# Patient Record
Sex: MALE | Race: WHITE | NOT HISPANIC OR LATINO
[De-identification: names, ages, dates, MRNs, and addresses within clinical notes are randomized per-mention and may not be internally consistent; named-entity substitution may affect disease eponyms.]

---

## 2018-12-03 ENCOUNTER — APPOINTMENT (OUTPATIENT)
Dept: PULMONOLOGY | Facility: CLINIC | Age: 48
End: 2018-12-03
Payer: COMMERCIAL

## 2018-12-03 VITALS
DIASTOLIC BLOOD PRESSURE: 100 MMHG | OXYGEN SATURATION: 93 % | HEIGHT: 72 IN | BODY MASS INDEX: 42.66 KG/M2 | HEART RATE: 96 BPM | RESPIRATION RATE: 18 BRPM | WEIGHT: 315 LBS | SYSTOLIC BLOOD PRESSURE: 140 MMHG | TEMPERATURE: 97.9 F

## 2018-12-03 DIAGNOSIS — I48.0 PAROXYSMAL ATRIAL FIBRILLATION: ICD-10-CM

## 2018-12-03 PROCEDURE — 99204 OFFICE O/P NEW MOD 45 MIN: CPT | Mod: GC

## 2018-12-03 RX ORDER — RIVAROXABAN 20 MG/1
20 TABLET, FILM COATED ORAL
Refills: 0 | Status: ACTIVE | COMMUNITY
Start: 2018-12-03

## 2023-10-27 ENCOUNTER — NON-APPOINTMENT (OUTPATIENT)
Age: 53
End: 2023-10-27

## 2023-11-01 ENCOUNTER — APPOINTMENT (OUTPATIENT)
Dept: PULMONOLOGY | Facility: CLINIC | Age: 53
End: 2023-11-01
Payer: COMMERCIAL

## 2023-11-01 VITALS
TEMPERATURE: 97.6 F | HEIGHT: 72 IN | BODY MASS INDEX: 42.66 KG/M2 | SYSTOLIC BLOOD PRESSURE: 187 MMHG | HEART RATE: 98 BPM | WEIGHT: 315 LBS | DIASTOLIC BLOOD PRESSURE: 96 MMHG | RESPIRATION RATE: 17 BRPM | OXYGEN SATURATION: 94 %

## 2023-11-01 VITALS — DIASTOLIC BLOOD PRESSURE: 94 MMHG | SYSTOLIC BLOOD PRESSURE: 139 MMHG

## 2023-11-01 PROCEDURE — 99213 OFFICE O/P EST LOW 20 MIN: CPT

## 2024-02-26 ENCOUNTER — APPOINTMENT (OUTPATIENT)
Dept: PULMONOLOGY | Facility: CLINIC | Age: 54
End: 2024-02-26
Payer: COMMERCIAL

## 2024-02-26 VITALS
WEIGHT: 315 LBS | RESPIRATION RATE: 16 BRPM | BODY MASS INDEX: 42.66 KG/M2 | SYSTOLIC BLOOD PRESSURE: 150 MMHG | OXYGEN SATURATION: 92 % | HEART RATE: 89 BPM | TEMPERATURE: 98 F | DIASTOLIC BLOOD PRESSURE: 102 MMHG | HEIGHT: 72 IN

## 2024-02-26 DIAGNOSIS — E66.01 MORBID (SEVERE) OBESITY DUE TO EXCESS CALORIES: ICD-10-CM

## 2024-02-26 DIAGNOSIS — G47.33 OBSTRUCTIVE SLEEP APNEA (ADULT) (PEDIATRIC): ICD-10-CM

## 2024-02-26 PROCEDURE — 99213 OFFICE O/P EST LOW 20 MIN: CPT

## 2024-02-26 RX ORDER — FUROSEMIDE 40 MG/1
40 TABLET ORAL
Qty: 180 | Refills: 0 | Status: ACTIVE | COMMUNITY
Start: 2024-01-19

## 2024-02-26 RX ORDER — CARVEDILOL PHOSPHATE 10 MG/1
10 CAPSULE, EXTENDED RELEASE ORAL
Qty: 90 | Refills: 0 | Status: ACTIVE | COMMUNITY
Start: 2024-02-09

## 2024-02-26 RX ORDER — POTASSIUM CHLORIDE 1500 MG/1
20 TABLET, EXTENDED RELEASE ORAL
Qty: 180 | Refills: 0 | Status: ACTIVE | COMMUNITY
Start: 2024-02-24

## 2024-02-26 RX ORDER — SILDENAFIL 100 MG/1
100 TABLET, FILM COATED ORAL
Qty: 6 | Refills: 0 | Status: ACTIVE | COMMUNITY
Start: 2023-09-09

## 2024-02-26 RX ORDER — DILTIAZEM HYDROCHLORIDE 180 MG/1
180 CAPSULE, EXTENDED RELEASE ORAL
Qty: 90 | Refills: 0 | Status: ACTIVE | COMMUNITY
Start: 2024-02-24

## 2024-02-26 RX ORDER — CARVEDILOL PHOSPHATE 20 MG/1
20 CAPSULE, EXTENDED RELEASE ORAL
Qty: 90 | Refills: 0 | Status: ACTIVE | COMMUNITY
Start: 2023-12-03

## 2024-02-26 RX ORDER — CARVEDILOL 6.25 MG/1
6.25 TABLET, FILM COATED ORAL TWICE DAILY
Refills: 0 | Status: COMPLETED | COMMUNITY
Start: 2018-12-03 | End: 2024-02-26

## 2024-02-26 RX ORDER — ALLOPURINOL 300 MG/1
300 TABLET ORAL
Qty: 90 | Refills: 0 | Status: ACTIVE | COMMUNITY
Start: 2023-12-29

## 2024-02-26 RX ORDER — SEMAGLUTIDE 1 MG/.5ML
1 INJECTION, SOLUTION SUBCUTANEOUS
Qty: 6 | Refills: 0 | Status: ACTIVE | COMMUNITY
Start: 2024-02-05

## 2024-02-26 RX ORDER — SEMAGLUTIDE 1.7 MG/.75ML
1.7 INJECTION, SOLUTION SUBCUTANEOUS
Qty: 9 | Refills: 0 | Status: ACTIVE | COMMUNITY
Start: 2024-02-13

## 2024-02-26 NOTE — HISTORY OF PRESENT ILLNESS
[Snoring] : snoring [Witnessed Apneas] : witnessed sleep apnea [Recent  Weight Gain] : recent weight gain [Obstructive Sleep Apnea] : obstructive sleep apnea [FreeTextEntry1] : 54 yo M PMH A.fib s/p cardioversion and severe KENDALL on bipap (dx 2004,  T90 93.8%), morbid obesity presenting for follow up of KENDALL.  Last seen in clinic Nov 2023. Today c/o upper jaw changes leading to overbite due to a nasal mask with tight fitting. States that he has been using his BiPAP device without difficulty. Currently on 16/14. Denies significant daytime fatigue or somnolence (ESS 2). No AM headaches. Continues to follow with his cardiologist for afib.   Lost 60 lbs over the past year via diet and medication prescribed by PCP.  BMI 65 (11/23) to 60 today  Meds: Coreg, rivaroxaban, furosemide, potassium, diltiazem, allopurinol  Diagnostic studies: HST 5/14/23: AHI 6.2 T90 17.7% CPAP titration 6/2/13: optimal pressure 14 cmH2O [Frequent Nocturnal Awakening] : no nocturnal awakening [Awakes Unrefreshed] : does not awake unrefreshed [Awakes with Headache] : no headache upon awakening [Awakening With Dry Mouth] : no dry mouth upon awakening [Daytime Somnolence] : no daytime somnolence [DIS] : no DIS [DMS] : no DMS [Lower Extremity Discomfort] : no lower extremity discomfort in evening or at bedtime [ESS] : 2

## 2024-02-26 NOTE — REVIEW OF SYSTEMS
[EDS: ESS=____] : no daytime somnolence [Fatigue] : no fatigue [Shortness Of Breath] : no shortness of breath [Chest Pain] : no chest pain [Difficulty Initiating Sleep] : no difficulty falling asleep

## 2024-02-26 NOTE — ASSESSMENT
[Obesity, Class III, BMI 40-49.9 (E66.01)] : macrophage activation syndrome [FreeTextEntry1] : 52 yo M PMH A.fib s/p cardioversion and severe KENDALL on bipap (dx 2004,  T90 93.8%), morbid obesity presenting for follow up of KENDALL.  Last seen in clinic Nov 2023. Today c/o upper jaw changes leading to overbite due to a nasal mask with tight fitting. States that he has been using his BiPAP device without difficulty. Currently on 16/14. Denies significant daytime fatigue or somnolence (ESS 2). No AM headaches.  He also complains of occasional dry mouth when he wakes up and is optimizing the machine's humidifier settings for comfort. Continues to follow with his cardiologist for afib.   Lost 60 lbs over the past year via diet and medication prescribed by PCP.  BMI 65 (11/23) to 60 today  Diagnostic studies: HST 5/14/23: AHI 6.2 T90 17.7% CPAP titration 6/2/13: optimal pressure 14 cm H2O  Assessment/Plan: 1) KENDALL/OHS on Bipap (dx 2004,  T90 93.8%): Compliance data shows normalization of AHI on BiPAP 16/14 with a minute ventilation 10.7 l/min. He continues to derive benefit from his derive with improvement in sleep consolidation and daytime somnolence (ESS 2). He denies difficulty with using the device. For his dental problems and overbite related to tight strap nasal mask use, we recommend him to get mask fitting evaluation with a cushioned nasal mask.  2) Obesity class III: He is recommended to continue to follow up with his primary care doctor for weight management.   Scar Lino MD  [facility] Fellow

## 2024-02-26 NOTE — PHYSICAL EXAM
[General Appearance - Well Developed] : well developed [General Appearance - Well Nourished] : well nourished [] : no respiratory distress [Exaggerated Use Of Accessory Muscles For Inspiration] : no accessory muscle use [Auscultation Breath Sounds / Voice Sounds] : lungs were clear to auscultation bilaterally [Cyanosis, Localized] : no localized cyanosis [Nail Clubbing] : no clubbing of the fingernails [Oriented To Time, Place, And Person] : oriented to person, place, and time [Neck Appearance] : the appearance of the neck was normal [Impaired Insight] : insight and judgment were intact [Heart Sounds] : normal S1 and S2 [FreeTextEntry1] : Irregularly irregular

## 2024-03-07 ENCOUNTER — APPOINTMENT (OUTPATIENT)
Dept: PULMONOLOGY | Facility: CLINIC | Age: 54
End: 2024-03-07
Payer: COMMERCIAL

## 2024-03-07 VITALS
HEART RATE: 96 BPM | BODY MASS INDEX: 42.66 KG/M2 | OXYGEN SATURATION: 87 % | WEIGHT: 315 LBS | DIASTOLIC BLOOD PRESSURE: 88 MMHG | HEIGHT: 72 IN | TEMPERATURE: 98 F | RESPIRATION RATE: 15 BRPM | SYSTOLIC BLOOD PRESSURE: 144 MMHG

## 2024-03-07 PROCEDURE — 94660 CPAP INITIATION&MGMT: CPT

## 2024-03-07 NOTE — PROCEDURE
[FreeTextEntry1] : Maskfitting Patient is currently using a Mirage FX mask, size wide. Patient was fit with two masks a [employer]Med Airfit N30i nasal mask, size Medium and a [employer] Eson 2 nasal mask, size Large.  ARCENIO TAI was comfortable with the fit.  He wanted to try masks that were not too different from what he is used to. Patient will try both masks and follow up with us within 2 weeks.

## 2025-09-11 ENCOUNTER — TRANSCRIPTION ENCOUNTER (OUTPATIENT)
Age: 55
End: 2025-09-11

## 2025-09-15 ENCOUNTER — TRANSCRIPTION ENCOUNTER (OUTPATIENT)
Age: 55
End: 2025-09-15